# Patient Record
Sex: FEMALE | Race: WHITE | NOT HISPANIC OR LATINO | URBAN - METROPOLITAN AREA
[De-identification: names, ages, dates, MRNs, and addresses within clinical notes are randomized per-mention and may not be internally consistent; named-entity substitution may affect disease eponyms.]

---

## 2023-03-20 ENCOUNTER — TELEPHONE (OUTPATIENT)
Dept: PRIMARY CARE | Facility: CLINIC | Age: 63
End: 2023-03-20

## 2023-03-20 DIAGNOSIS — L40.9 PSORIASIS: Primary | ICD-10-CM

## 2023-03-21 RX ORDER — APREMILAST 30 MG/1
TABLET, FILM COATED ORAL
Qty: 60 TABLET | Refills: 5 | Status: SHIPPED | OUTPATIENT
Start: 2023-03-21 | End: 2023-10-14 | Stop reason: SDUPTHER

## 2023-07-10 ENCOUNTER — OFFICE VISIT (OUTPATIENT)
Dept: PRIMARY CARE | Facility: CLINIC | Age: 63
End: 2023-07-10
Payer: MEDICARE

## 2023-07-10 VITALS
OXYGEN SATURATION: 98 % | TEMPERATURE: 98 F | HEIGHT: 62 IN | WEIGHT: 124.6 LBS | SYSTOLIC BLOOD PRESSURE: 130 MMHG | HEART RATE: 89 BPM | RESPIRATION RATE: 16 BRPM | BODY MASS INDEX: 22.93 KG/M2 | DIASTOLIC BLOOD PRESSURE: 79 MMHG

## 2023-07-10 DIAGNOSIS — F17.211 NICOTINE DEPENDENCE, CIGARETTES, IN REMISSION: ICD-10-CM

## 2023-07-10 DIAGNOSIS — Z12.31 VISIT FOR SCREENING MAMMOGRAM: ICD-10-CM

## 2023-07-10 DIAGNOSIS — L40.9 PSORIASIS: ICD-10-CM

## 2023-07-10 DIAGNOSIS — Z00.00 WELCOME TO MEDICARE PREVENTIVE VISIT: Primary | ICD-10-CM

## 2023-07-10 DIAGNOSIS — F41.9 ANXIETY: ICD-10-CM

## 2023-07-10 DIAGNOSIS — E55.9 VITAMIN D DEFICIENCY: Primary | ICD-10-CM

## 2023-07-10 DIAGNOSIS — Z01.818 PREOPERATIVE CLEARANCE: ICD-10-CM

## 2023-07-10 DIAGNOSIS — Z78.0 MENOPAUSE: ICD-10-CM

## 2023-07-10 DIAGNOSIS — M15.9 PRIMARY OSTEOARTHRITIS INVOLVING MULTIPLE JOINTS: ICD-10-CM

## 2023-07-10 DIAGNOSIS — Z00.00 ROUTINE GENERAL MEDICAL EXAMINATION AT HEALTH CARE FACILITY: ICD-10-CM

## 2023-07-10 DIAGNOSIS — Z00.00 WELCOME TO MEDICARE PREVENTIVE VISIT: ICD-10-CM

## 2023-07-10 DIAGNOSIS — Z12.11 COLON CANCER SCREENING: ICD-10-CM

## 2023-07-10 DIAGNOSIS — Z13.6 SCREENING FOR CARDIOVASCULAR CONDITION: ICD-10-CM

## 2023-07-10 DIAGNOSIS — F17.210 CIGARETTE NICOTINE DEPENDENCE WITHOUT COMPLICATION: ICD-10-CM

## 2023-07-10 PROBLEM — J44.9 COPD (CHRONIC OBSTRUCTIVE PULMONARY DISEASE) (MULTI): Status: ACTIVE | Noted: 2023-07-10

## 2023-07-10 PROBLEM — F32.A DEPRESSION: Status: ACTIVE | Noted: 2023-07-10

## 2023-07-10 PROBLEM — L40.50 PSORIATIC ARTHRITIS (MULTI): Status: ACTIVE | Noted: 2023-07-10

## 2023-07-10 PROBLEM — M25.561 CHRONIC PAIN OF BOTH KNEES: Status: ACTIVE | Noted: 2023-07-10

## 2023-07-10 PROBLEM — M15.0 PRIMARY OSTEOARTHRITIS INVOLVING MULTIPLE JOINTS: Status: ACTIVE | Noted: 2023-07-10

## 2023-07-10 PROBLEM — G89.29 CHRONIC PAIN OF BOTH KNEES: Status: ACTIVE | Noted: 2023-07-10

## 2023-07-10 PROBLEM — M25.562 CHRONIC PAIN OF BOTH KNEES: Status: ACTIVE | Noted: 2023-07-10

## 2023-07-10 PROBLEM — F17.200 NICOTINE DEPENDENCE: Status: ACTIVE | Noted: 2023-07-10

## 2023-07-10 PROCEDURE — 4004F PT TOBACCO SCREEN RCVD TLK: CPT | Performed by: FAMILY MEDICINE

## 2023-07-10 PROCEDURE — 1159F MED LIST DOCD IN RCRD: CPT | Performed by: FAMILY MEDICINE

## 2023-07-10 PROCEDURE — 1160F RVW MEDS BY RX/DR IN RCRD: CPT | Performed by: FAMILY MEDICINE

## 2023-07-10 PROCEDURE — G0402 INITIAL PREVENTIVE EXAM: HCPCS | Performed by: FAMILY MEDICINE

## 2023-07-10 PROCEDURE — G0403 EKG FOR INITIAL PREVENT EXAM: HCPCS | Performed by: FAMILY MEDICINE

## 2023-07-10 PROCEDURE — 1170F FXNL STATUS ASSESSED: CPT | Performed by: FAMILY MEDICINE

## 2023-07-10 RX ORDER — HYDROXYZINE HYDROCHLORIDE 25 MG/1
1 TABLET, FILM COATED ORAL 3 TIMES DAILY PRN
COMMUNITY
Start: 2022-07-21 | End: 2023-07-10 | Stop reason: SDUPTHER

## 2023-07-10 RX ORDER — IBUPROFEN 600 MG/1
600 TABLET ORAL 4 TIMES DAILY PRN
Qty: 90 TABLET | Refills: 1 | Status: SHIPPED | OUTPATIENT
Start: 2023-07-10 | End: 2024-04-17

## 2023-07-10 RX ORDER — VIT C/E/ZN/COPPR/LUTEIN/ZEAXAN 250MG-90MG
CAPSULE ORAL
COMMUNITY

## 2023-07-10 RX ORDER — HYDROXYZINE HYDROCHLORIDE 25 MG/1
25 TABLET, FILM COATED ORAL 3 TIMES DAILY PRN
Qty: 90 TABLET | Refills: 2 | Status: SHIPPED | OUTPATIENT
Start: 2023-07-10

## 2023-07-10 RX ORDER — ASPIRIN 81 MG/1
TABLET ORAL
COMMUNITY

## 2023-07-10 ASSESSMENT — ACTIVITIES OF DAILY LIVING (ADL)
MANAGING_FINANCES: INDEPENDENT
BATHING: INDEPENDENT
GROCERY_SHOPPING: INDEPENDENT
DOING_HOUSEWORK: INDEPENDENT
DRESSING: INDEPENDENT
TAKING_MEDICATION: INDEPENDENT

## 2023-07-10 ASSESSMENT — PATIENT HEALTH QUESTIONNAIRE - PHQ9
2. FEELING DOWN, DEPRESSED OR HOPELESS: NOT AT ALL
2. FEELING DOWN, DEPRESSED OR HOPELESS: SEVERAL DAYS
SUM OF ALL RESPONSES TO PHQ9 QUESTIONS 1 & 2: 2
1. LITTLE INTEREST OR PLEASURE IN DOING THINGS: NOT AT ALL
1. LITTLE INTEREST OR PLEASURE IN DOING THINGS: SEVERAL DAYS
SUM OF ALL RESPONSES TO PHQ9 QUESTIONS 1 AND 2: 0
10. IF YOU CHECKED OFF ANY PROBLEMS, HOW DIFFICULT HAVE THESE PROBLEMS MADE IT FOR YOU TO DO YOUR WORK, TAKE CARE OF THINGS AT HOME, OR GET ALONG WITH OTHER PEOPLE: SOMEWHAT DIFFICULT

## 2023-07-10 ASSESSMENT — ENCOUNTER SYMPTOMS
FLANK PAIN: 1
DEPRESSION: 1
LOSS OF SENSATION IN FEET: 0
OCCASIONAL FEELINGS OF UNSTEADINESS: 0
HEMATURIA: 1
ARTHRALGIAS: 1

## 2023-07-10 ASSESSMENT — LIFESTYLE VARIABLES
HOW OFTEN DO YOU HAVE SIX OR MORE DRINKS ON ONE OCCASION: NEVER
HOW MANY STANDARD DRINKS CONTAINING ALCOHOL DO YOU HAVE ON A TYPICAL DAY: 1 OR 2
SKIP TO QUESTIONS 9-10: 1
HOW OFTEN DO YOU HAVE A DRINK CONTAINING ALCOHOL: MONTHLY OR LESS
AUDIT-C TOTAL SCORE: 1

## 2023-07-10 NOTE — PROGRESS NOTES
"Subjective   Reason for Visit: Barbara Givens is an 63 y.o. female here for a Medicare Wellness visit.     Past Medical, Surgical, and Family History reviewed and updated in chart.    Reviewed all medications by prescribing practitioner or clinical pharmacist (such as prescriptions, OTCs, herbal therapies and supplements) and documented in the medical record.    Med Refill  Associated symptoms include arthralgias.       Patient Care Team:  Adriana Esteban MD as PCP - General  Adriana Esteban MD as PCP - [employer] PCP     Review of Systems   Genitourinary:  Positive for flank pain and hematuria.   Musculoskeletal:  Positive for arthralgias.       Objective   Vitals:  /79   Pulse 89   Temp 36.7 °C (98 °F) (Temporal)   Resp 16   Ht 1.575 m (5' 2.01\")   Wt 56.5 kg (124 lb 9.6 oz)   SpO2 98%   BMI 22.78 kg/m²       Physical Exam    Assessment/Plan   Problem List Items Addressed This Visit       Nicotine dependence, cigarettes, in remission    Vitamin D deficiency - Primary    Relevant Orders    Vitamin D 25-Hydroxy,Total    Psoriasis    Welcome to Medicare preventive visit    Menopause    Relevant Orders    XR DEXA bone density    Primary osteoarthritis involving multiple joints    Relevant Medications    ibuprofen 600 mg tablet     Other Visit Diagnoses       Anxiety        Relevant Medications    hydrOXYzine HCL (Atarax) 25 mg tablet    Screening for cardiovascular condition        Relevant Orders    ECG 12 lead (Completed)    Routine general medical examination at health care facility        Colon cancer screening        Relevant Orders    Referral to General Surgery          Cleared for your percutaneous nephrostomy- good luck. Get mammo., lung screening, labwork , and bone scan after.      "

## 2023-07-13 ENCOUNTER — HOSPITAL ENCOUNTER (OUTPATIENT)
Dept: DATA CONVERSION | Facility: HOSPITAL | Age: 63
End: 2023-07-14
Attending: STUDENT IN AN ORGANIZED HEALTH CARE EDUCATION/TRAINING PROGRAM | Admitting: STUDENT IN AN ORGANIZED HEALTH CARE EDUCATION/TRAINING PROGRAM
Payer: MEDICARE

## 2023-07-13 DIAGNOSIS — N20.0 CALCULUS OF KIDNEY: ICD-10-CM

## 2023-07-13 LAB
ABO GROUP (TYPE) IN BLOOD: NORMAL
ANION GAP IN SER/PLAS: 11 MMOL/L (ref 10–20)
ANTIBODY SCREEN: NORMAL
CALCIUM (MG/DL) IN SER/PLAS: 8.6 MG/DL (ref 8.6–10.3)
CARBON DIOXIDE, TOTAL (MMOL/L) IN SER/PLAS: 24 MMOL/L (ref 21–32)
CHLORIDE (MMOL/L) IN SER/PLAS: 109 MMOL/L (ref 98–107)
CREATININE (MG/DL) IN SER/PLAS: 0.78 MG/DL (ref 0.5–1.05)
ERYTHROCYTE DISTRIBUTION WIDTH (RATIO) BY AUTOMATED COUNT: 11.7 % (ref 11.5–14.5)
ERYTHROCYTE MEAN CORPUSCULAR HEMOGLOBIN CONCENTRATION (G/DL) BY AUTOMATED: 33.1 G/DL (ref 32–36)
ERYTHROCYTE MEAN CORPUSCULAR VOLUME (FL) BY AUTOMATED COUNT: 96 FL (ref 80–100)
ERYTHROCYTES (10*6/UL) IN BLOOD BY AUTOMATED COUNT: 4.41 X10E12/L (ref 4–5.2)
GFR FEMALE: 84 ML/MIN/1.73M2
GLUCOSE (MG/DL) IN SER/PLAS: 90 MG/DL (ref 74–99)
HEMATOCRIT (%) IN BLOOD BY AUTOMATED COUNT: 42.3 % (ref 36–46)
HEMOGLOBIN (G/DL) IN BLOOD: 14 G/DL (ref 12–16)
LEUKOCYTES (10*3/UL) IN BLOOD BY AUTOMATED COUNT: 9.6 X10E9/L (ref 4.4–11.3)
PLATELETS (10*3/UL) IN BLOOD AUTOMATED COUNT: 203 X10E9/L (ref 150–450)
POTASSIUM (MMOL/L) IN SER/PLAS: 4.1 MMOL/L (ref 3.5–5.3)
RH FACTOR: NORMAL
SODIUM (MMOL/L) IN SER/PLAS: 140 MMOL/L (ref 136–145)
UREA NITROGEN (MG/DL) IN SER/PLAS: 13 MG/DL (ref 6–23)

## 2023-07-14 LAB
ANION GAP IN SER/PLAS: 13 MMOL/L (ref 10–20)
CALCIUM (MG/DL) IN SER/PLAS: 8.5 MG/DL (ref 8.6–10.3)
CARBON DIOXIDE, TOTAL (MMOL/L) IN SER/PLAS: 24 MMOL/L (ref 21–32)
CHLORIDE (MMOL/L) IN SER/PLAS: 105 MMOL/L (ref 98–107)
COMPLETE PATHOLOGY REPORT: NORMAL
CONVERTED CLINICAL DIAGNOSIS-HISTORY: NORMAL
CONVERTED FINAL DIAGNOSIS: NORMAL
CONVERTED FINAL REPORT PDF LINK TO COPY AND PASTE: NORMAL
CONVERTED GROSS DESCRIPTION: NORMAL
CONVERTED MICROSCOPIC DESCRIPTION: NORMAL
CREATININE (MG/DL) IN SER/PLAS: 0.82 MG/DL (ref 0.5–1.05)
ERYTHROCYTE DISTRIBUTION WIDTH (RATIO) BY AUTOMATED COUNT: 11.9 % (ref 11.5–14.5)
ERYTHROCYTE MEAN CORPUSCULAR HEMOGLOBIN CONCENTRATION (G/DL) BY AUTOMATED: 33.9 G/DL (ref 32–36)
ERYTHROCYTE MEAN CORPUSCULAR VOLUME (FL) BY AUTOMATED COUNT: 94 FL (ref 80–100)
ERYTHROCYTES (10*6/UL) IN BLOOD BY AUTOMATED COUNT: 4.03 X10E12/L (ref 4–5.2)
GFR FEMALE: 79 ML/MIN/1.73M2
GLUCOSE (MG/DL) IN SER/PLAS: 91 MG/DL (ref 74–99)
HEMATOCRIT (%) IN BLOOD BY AUTOMATED COUNT: 38 % (ref 36–46)
HEMOGLOBIN (G/DL) IN BLOOD: 12.9 G/DL (ref 12–16)
LEUKOCYTES (10*3/UL) IN BLOOD BY AUTOMATED COUNT: 12.3 X10E9/L (ref 4.4–11.3)
PLATELETS (10*3/UL) IN BLOOD AUTOMATED COUNT: 223 X10E9/L (ref 150–450)
POTASSIUM (MMOL/L) IN SER/PLAS: 3.9 MMOL/L (ref 3.5–5.3)
SODIUM (MMOL/L) IN SER/PLAS: 138 MMOL/L (ref 136–145)
UREA NITROGEN (MG/DL) IN SER/PLAS: 11 MG/DL (ref 6–23)

## 2023-07-21 LAB
CALCULI COMPOSITION: NORMAL
CALCULI DESCRIPTION: NORMAL
CALCULI MASS: 281 MG

## 2023-08-08 ENCOUNTER — LAB (OUTPATIENT)
Dept: LAB | Facility: LAB | Age: 63
End: 2023-08-08
Payer: MEDICARE

## 2023-08-08 DIAGNOSIS — E55.9 VITAMIN D DEFICIENCY: ICD-10-CM

## 2023-08-08 LAB — CALCIDIOL (25 OH VITAMIN D3) (NG/ML) IN SER/PLAS: 53 NG/ML

## 2023-08-08 PROCEDURE — 36415 COLL VENOUS BLD VENIPUNCTURE: CPT

## 2023-08-08 PROCEDURE — 82306 VITAMIN D 25 HYDROXY: CPT

## 2023-08-11 NOTE — RESULT ENCOUNTER NOTE
Bone scan positive for osteoporosis, meaning you should probably consdeir medication like once weekly alendronate. Do you want to discuss?

## 2023-09-29 VITALS — BODY MASS INDEX: 22.72 KG/M2 | HEIGHT: 62 IN | WEIGHT: 123.46 LBS

## 2023-09-30 NOTE — DISCHARGE SUMMARY
Send Summary:   Discharge Summary Providers:  Provider Role Provider Name   · Primary Adriana Esteban       Note Recipients: Adriana Esteban MD       Discharge:    Summary:   Admission Date: .13-Jul-2023 08:51:00   Discharge Date: 14-Jul-2023   Admission Reason: Right PCNL   Final Discharge Diagnoses: Right nephrolithiasis   Procedures: Date: 13-Jul-2023 13:20:00  Procedure Name: 1.  Right complex percutaneous nephrolithotomy (PCNL) - multiple separate caliceal stones   2. Right antegrade ureteroscopy with laser lithotripsy  3. Right ureteral stent insertion   Vital Signs:        T   P  R  BP   MAP  SpO2   Value  37.2  69  16  135/74      95%  Date/Time 7/14 3:22 7/14 3:22 7/14 3:22 7/14 3:22 7/14 3:22  Range  (36.7C - 37.5C )  (65 - 70 )  (16 - 16 )  (130 - 154 )/ (74 - 86 )    (94% - 98% )  Highest temp of 37.5 C was recorded at 7/13 17:16    Date:            Weight/Scale Type:  Height:   13-Jul-2023 15:52  56  kg / bed  157.4  cm  Hospital Course:    Patient admitted after right PCNL for pain control, hemodynamic monitoring. Did well POD 0. By POD #0, renal function remained within normal limits, hemoglobin was  relatively stable, patient had achieved adequate pain control and deemed appropriate for discharge. She will follow up in 3 weeks for ureteral stent removal.       Discharge Information:    and Continuing Care:   Lab Results - Pending:    Surgical Pathology Drawn at 13-Jul-2023 13:05:00  Radiology Results - Pending: None   Discharge Instructions:    Activity:           activity as tolerated.          May shower..  tomorrow          May not drive while taking narcotics.            No pushing, pulling, or lifting objects greater than 20 pounds for 1 week(s).      Nutrition/Diet:           regular    Wound Care:           Wound Site:   right flank          Change Dressing:   as needed          Cover With:   4x4 gauze,  or bandaid    Discharge Medications: Home Medication    hydrocodone-acetaminophen 5 mg-325 mg oral tablet - 1 tab(s) orally every 6 hours as needed for pain   Doculase 100 mg oral capsule - 1 cap(s) orally 2 times a day while taking pain medication; hold for loose stools 1  multivitamin Multiple Vitamins oral capsule - 1 cap(s) oral once a day  Biotin - 1 cap(s) oral once a day  hydrOXYzine hydrochloride 25 mg oral tablet - 1 cap(s) oral prn  Vitamin D3 - 1 cap(s) oral once a day  Otezla 30 mg oral tablet - 1 cap(s) oral once a day     PRN Medication     DNR Status:   ·  Code Status Code Status order at time of discharge: Full Code       Electronic Signatures:  James Felix)  (Signed 14-Jul-2023 08:34)   Authored: Send Summary, Summary Content, Ongoing Care,  DNR Status, Note Completion      Last Updated: 14-Jul-2023 08:34 by James Felix)

## 2023-09-30 NOTE — H&P
History of Present Illness:   History Present Illness:  Reason for surgery: Right nephrolithiasis   HPI:    Patient presents with above.  Here for right PCNL.  Essentially reviewed the risk of the procedure including bleeding, infection, pain, need for additional procedures,  risk of anesthesia, pseudoaneurysm/AV fistula, etc.    Allergies:        Allergies:  ·  No Known Allergies :     Home Medication Review:   Home Medications Reviewed: yes     Impression/Procedure:   ·  Impression and Planned Procedure: Right nephrolithiasis  Plan: Right PCNL       ERAS (Enhanced Recovery After Surgery):  ·  ERAS Patient: no       Physical Exam by System:    Respiratory/Thorax: Unlabored   Cardiovascular: Normal rate     Consent:   COVID-19 Consent:  ·  COVID-19 Risk Consent Surgeon has reviewed key risks related to the risk of vita COVID-19 and if they contract COVID-19 what the risks are.       Electronic Signatures:  James Felix)  (Signed 13-Jul-2023 09:51)   Authored: History of Present Illness, Allergies, Home  Medication Review, Impression/Procedure, ERAS, Physical Exam, Consent, Note Completion      Last Updated: 13-Jul-2023 09:51 by James Felix)

## 2023-10-02 NOTE — OP NOTE
PROCEDURE DETAILS    Preoperative Diagnosis:  Renal calculus, right, N20.0    Postoperative Diagnosis:  Renal calculus, right, N20.0    Surgeon: James Felix  Resident/Fellow/Other Assistant: None of these were associated with this case    Procedure:  1.  Right complex percutaneous nephrolithotomy (PCNL) - multiple separate caliceal stones   2. Right antegrade ureteroscopy with laser lithotripsy  3. Right ureteral stent insertion   Anesthesia: No anesthesiologist associated with this case  Estimated Blood Loss: 5 ml   Findings: 1.5 cm renal pelvis calculus  7 mm posterior caliceal stone  5 mm mid ureteral calculus   Specimens(s) Collected: yes,  stone for analysis    Complications: none immediate   Drains and/or Catheters: 16 F flank drain  6 F x 24 cm ureteral stent (without strings)        Operative Report:   Patient was met in the preoperative holding area where informed consent was obtained.  Brought to the operative suite where general anesthesia was induced in  supine position.  Funk catheter placed.  EPC cuffs on and working.  Received 2 g Ancef IV piggyback for antibiotic prophylaxis.  Transferred to prone position with all pressure points including knees and shoulders padded.  Prepped and draped in usual  fashion.  Timeout performed and all parties in agreement.  Interventional radiology had achieved posterior upper pole access prior in the day.  Advanced a straight Super Stiff Glidewire down into the bladder under fluoroscopic guidance and placed a second  Glidewire alongside this into the bladder.  Advanced a dual-lumen catheter to measure calyceal depth of 5 cm from the skin.  Antegrade nephrostogram with filling defect posterior upper pole calyx and larger stone radiopaque in the renal pelvis.  Used  a encore [employer] 30 Kyr[place]z balloon to dilate the tract in usual fashion and over this advanced a 30 Kyrgyz by 12 cm sheath into the upper pole calyx.  Immediately encountered the larger  upper to mid pole calyceal stone which was treated entirely  with the trilogy device.  I then advanced my nephroscope into the renal pelvis and treated the 1.5 cm calculus in the renal pelvis with a  combination of ballistic and ultrasonic energy.  Careful antegrade pyelogram and pan pyeloscopy demonstrated no  other substantial stone fragments however there was a dilated proximal ureter so I elected to perform antegrade ureteroscopy with a [employer] single use digital Future Fleet ureteroscope.  Encountered a 5 mm obstructing calculus in the mid ureter.   I presume this was a calculus previously noted in the lower pole as careful survey of the collecting system demonstrated no other substantial remaining fragments.  I used a 200 µm holmium laser fiber to fragment the calculus into submillimeter pieces  which were pushed into the bladder.  There was no damage to the ureter or remaining fragments.  Over the remaining safety wire I advanced a 6 Kiswahili by 24 cm [employer] stent without strings in the usual fashion with the distal curl position  confirmed fluoroscopically in the proximal curl position confirmed nephroscopically.  As there is mild bleeding from the percutaneous tract elected to leave a nephrostomy drain as well and I placed a 16 Kiswahili Soboba tip catheter into the renal pelvis  under fluoroscopic guidance.  Antegrade nephrostogram without evidence of extravasation or remaining stone fragment.  Access sheath was removed and catheter was secured at the level of the skin with an 0 Ethibond suture.  No brisk bleeding from the skin.   Dressing applied.  Patient was awoken and taken to recovery in stable condition after returning to supine position.  Note that this is a complex procedure due to location of stones and multiple separate calyces, as well as requirement for antegrade ureteroscopy  and laser lithotripsy.    Plan:  The patient will be admitted overnight for hemodynamic monitoring.   She will be discharged tomorrow morning after flank drain removal if her hemoglobin  remained stable.                        Attestation:   Note Completion:  Attending Attestation I performed the procedure without a resident         Electronic Signatures:  James Felix)  (Signed 13-Jul-2023 13:30)   Authored: Post-Operative Note, Chart Review, Note Completion      Last Updated: 13-Jul-2023 13:30 by James Felix)

## 2023-10-14 DIAGNOSIS — L40.9 PSORIASIS: ICD-10-CM

## 2023-10-14 RX ORDER — APREMILAST 30 MG/1
30 TABLET, FILM COATED ORAL 2 TIMES DAILY
Qty: 60 TABLET | Refills: 5 | Status: SHIPPED | OUTPATIENT
Start: 2023-10-14 | End: 2024-04-12

## 2023-10-15 ENCOUNTER — SPECIALTY PHARMACY (OUTPATIENT)
Dept: PHARMACY | Facility: CLINIC | Age: 63
End: 2023-10-15

## 2023-10-19 ENCOUNTER — SPECIALTY PHARMACY (OUTPATIENT)
Dept: PHARMACY | Facility: CLINIC | Age: 63
End: 2023-10-19

## 2023-10-24 ENCOUNTER — TELEPHONE (OUTPATIENT)
Dept: PRIMARY CARE | Facility: CLINIC | Age: 63
End: 2023-10-24
Payer: MEDICARE

## 2023-10-24 DIAGNOSIS — M81.0 OSTEOPOROSIS WITHOUT CURRENT PATHOLOGICAL FRACTURE, UNSPECIFIED OSTEOPOROSIS TYPE: Primary | ICD-10-CM

## 2023-10-25 RX ORDER — ALENDRONATE SODIUM 70 MG/1
70 TABLET ORAL
Qty: 4 TABLET | Refills: 11 | Status: SHIPPED | OUTPATIENT
Start: 2023-10-25 | End: 2024-10-24

## 2023-12-11 ENCOUNTER — APPOINTMENT (OUTPATIENT)
Dept: GASTROENTEROLOGY | Facility: HOSPITAL | Age: 63
End: 2023-12-11
Payer: MEDICARE

## 2023-12-27 ENCOUNTER — TELEPHONE (OUTPATIENT)
Dept: PRIMARY CARE | Facility: CLINIC | Age: 63
End: 2023-12-27
Payer: MEDICARE

## 2023-12-27 DIAGNOSIS — H66.90 ACUTE EAR INFECTION, UNSPECIFIED LATERALITY: Primary | ICD-10-CM

## 2023-12-27 NOTE — TELEPHONE ENCOUNTER
New red request     Antibiotics for an ear infections    Started about 5 days sounds of fluid in left ear     No medication

## 2024-01-02 PROBLEM — J40 BRONCHITIS: Status: ACTIVE | Noted: 2024-01-02

## 2024-01-02 RX ORDER — CEFDINIR 300 MG/1
300 CAPSULE ORAL 2 TIMES DAILY
Qty: 20 CAPSULE | Refills: 0 | Status: SHIPPED | OUTPATIENT
Start: 2024-01-02 | End: 2024-01-12

## 2024-01-02 NOTE — TELEPHONE ENCOUNTER
Patient went to [facility] and completed ATB's and is still having fluid in ears, pain, and is wondering if she could have an appt or another antibiotic   Please call [phone]

## 2024-01-04 ENCOUNTER — TELEPHONE (OUTPATIENT)
Dept: PRIMARY CARE | Facility: CLINIC | Age: 64
End: 2024-01-04
Payer: MEDICARE

## 2024-01-08 NOTE — TELEPHONE ENCOUNTER
Cefdinir was send 1/2/2024, has that made any difference? Could it be wax? If the antibiotic does not make it better, I would suggest being seen somewhere!     Cefdinir did not help. She is going to see ENT

## 2024-01-29 ENCOUNTER — TELEPHONE (OUTPATIENT)
Dept: PRIMARY CARE | Facility: CLINIC | Age: 64
End: 2024-01-29
Payer: MEDICARE

## 2024-03-25 ENCOUNTER — TELEPHONE (OUTPATIENT)
Dept: PRIMARY CARE | Facility: CLINIC | Age: 64
End: 2024-03-25
Payer: MEDICARE

## 2024-04-17 DIAGNOSIS — M15.9 PRIMARY OSTEOARTHRITIS INVOLVING MULTIPLE JOINTS: ICD-10-CM

## 2024-04-17 RX ORDER — IBUPROFEN 600 MG/1
600 TABLET ORAL 4 TIMES DAILY PRN
Qty: 90 TABLET | Refills: 1 | Status: SHIPPED | OUTPATIENT
Start: 2024-04-17

## 2024-05-08 DIAGNOSIS — L40.9 PSORIASIS: ICD-10-CM

## 2024-05-08 RX ORDER — APREMILAST 30 MG/1
30 TABLET, FILM COATED ORAL 2 TIMES DAILY
Qty: 60 TABLET | Refills: 8 | Status: SHIPPED | OUTPATIENT
Start: 2024-05-08

## 2024-05-15 ENCOUNTER — TELEPHONE (OUTPATIENT)
Dept: UROLOGY | Facility: CLINIC | Age: 64
End: 2024-05-15
Payer: MEDICARE

## 2024-05-15 DIAGNOSIS — R35.0 URINARY FREQUENCY: Primary | ICD-10-CM

## 2024-05-15 NOTE — TELEPHONE ENCOUNTER
[facility] requesting refill on Gemtesa 75MG 1 tablet daily, patient last saw Dr. Felix 9/2023, no upcoming appointments.   Patient picked up RX 4/22/2024, pharmacy asking for refills.

## 2024-06-21 ENCOUNTER — TELEPHONE (OUTPATIENT)
Dept: PRIMARY CARE | Facility: CLINIC | Age: 64
End: 2024-06-21
Payer: MEDICARE

## 2024-06-21 NOTE — TELEPHONE ENCOUNTER
[facility] is calling regarding the diagnosis verification form they received the fax and cannot get her approved with depression unspecified, they are asking if she has major depressive order?

## 2024-06-24 PROBLEM — F32.0 CURRENT MILD EPISODE OF MAJOR DEPRESSIVE DISORDER WITHOUT PRIOR EPISODE (CMS-HCC): Status: ACTIVE | Noted: 2023-07-10

## 2024-10-17 ENCOUNTER — APPOINTMENT (OUTPATIENT)
Dept: LAB | Facility: LAB | Age: 64
End: 2024-10-17
Payer: MEDICARE

## 2024-10-17 ENCOUNTER — APPOINTMENT (OUTPATIENT)
Dept: PRIMARY CARE | Facility: CLINIC | Age: 64
End: 2024-10-17
Payer: MEDICARE

## 2024-10-17 VITALS
DIASTOLIC BLOOD PRESSURE: 82 MMHG | HEIGHT: 62 IN | WEIGHT: 127 LBS | HEART RATE: 84 BPM | BODY MASS INDEX: 23.37 KG/M2 | OXYGEN SATURATION: 98 % | RESPIRATION RATE: 16 BRPM | SYSTOLIC BLOOD PRESSURE: 126 MMHG

## 2024-10-17 DIAGNOSIS — L40.50 PSORIATIC ARTHRITIS (MULTI): ICD-10-CM

## 2024-10-17 DIAGNOSIS — Z00.00 ROUTINE GENERAL MEDICAL EXAMINATION AT HEALTH CARE FACILITY: Primary | ICD-10-CM

## 2024-10-17 DIAGNOSIS — F32.0 CURRENT MILD EPISODE OF MAJOR DEPRESSIVE DISORDER WITHOUT PRIOR EPISODE (CMS-HCC): ICD-10-CM

## 2024-10-17 DIAGNOSIS — Z12.31 VISIT FOR SCREENING MAMMOGRAM: ICD-10-CM

## 2024-10-17 DIAGNOSIS — J43.2 CENTRILOBULAR EMPHYSEMA (MULTI): ICD-10-CM

## 2024-10-17 DIAGNOSIS — F17.210 CIGARETTE NICOTINE DEPENDENCE WITHOUT COMPLICATION: ICD-10-CM

## 2024-10-17 DIAGNOSIS — E78.5 BORDERLINE HYPERLIPIDEMIA: ICD-10-CM

## 2024-10-17 DIAGNOSIS — F41.9 ANXIETY: ICD-10-CM

## 2024-10-17 DIAGNOSIS — M15.0 PRIMARY OSTEOARTHRITIS INVOLVING MULTIPLE JOINTS: ICD-10-CM

## 2024-10-17 PROBLEM — I83.813 VARICOSE VEINS OF BOTH LOWER EXTREMITIES WITH PAIN: Status: ACTIVE | Noted: 2024-10-17

## 2024-10-17 PROBLEM — M25.539 CHRONIC WRIST PAIN: Status: ACTIVE | Noted: 2024-10-17

## 2024-10-17 PROBLEM — J01.90 ACUTE BACTERIAL SINUSITIS: Status: ACTIVE | Noted: 2024-10-17

## 2024-10-17 PROBLEM — B96.89 ACUTE BACTERIAL SINUSITIS: Status: ACTIVE | Noted: 2024-10-17

## 2024-10-17 PROBLEM — J43.9 EMPHYSEMA/COPD (MULTI): Status: ACTIVE | Noted: 2024-10-17

## 2024-10-17 PROBLEM — M65.30 TRIGGER FINGER: Status: ACTIVE | Noted: 2024-10-17

## 2024-10-17 PROBLEM — H40.9 GLAUCOMA: Status: ACTIVE | Noted: 2024-10-17

## 2024-10-17 PROBLEM — N32.81 OVERACTIVE BLADDER: Status: ACTIVE | Noted: 2024-10-17

## 2024-10-17 PROBLEM — R29.898 WEAKNESS OF HAND: Status: ACTIVE | Noted: 2024-10-17

## 2024-10-17 PROBLEM — M19.90 ARTHRITIS: Status: ACTIVE | Noted: 2024-10-17

## 2024-10-17 PROBLEM — T16.1XXA FOREIGN BODY OF RIGHT EAR: Status: ACTIVE | Noted: 2024-10-17

## 2024-10-17 PROBLEM — M75.81 TENDINITIS OF BOTH ROTATOR CUFFS: Status: ACTIVE | Noted: 2024-10-17

## 2024-10-17 PROBLEM — F17.200 NICOTINE DEPENDENCE: Status: ACTIVE | Noted: 2024-10-17

## 2024-10-17 PROBLEM — N20.0 BILATERAL NEPHROLITHIASIS: Status: ACTIVE | Noted: 2024-10-17

## 2024-10-17 PROBLEM — I83.90 VARICOSE VEIN OF LEG: Status: ACTIVE | Noted: 2024-10-17

## 2024-10-17 PROBLEM — M75.82 TENDINITIS OF BOTH ROTATOR CUFFS: Status: ACTIVE | Noted: 2024-10-17

## 2024-10-17 PROBLEM — N95.2 POST-MENOPAUSAL ATROPHIC VAGINITIS: Status: ACTIVE | Noted: 2024-10-17

## 2024-10-17 PROBLEM — M25.50 ARTHRALGIA: Status: ACTIVE | Noted: 2024-10-17

## 2024-10-17 PROBLEM — G89.29 CHRONIC WRIST PAIN: Status: ACTIVE | Noted: 2024-10-17

## 2024-10-17 PROBLEM — R39.9 UTI SYMPTOMS: Status: ACTIVE | Noted: 2024-10-17

## 2024-10-17 PROBLEM — R31.9 HEMATURIA: Status: ACTIVE | Noted: 2024-10-17

## 2024-10-17 PROBLEM — L57.0 ACTINIC KERATOSIS: Status: ACTIVE | Noted: 2024-10-17

## 2024-10-17 PROBLEM — R10.9 FLANK PAIN, ACUTE: Status: ACTIVE | Noted: 2024-10-17

## 2024-10-17 PROCEDURE — 1160F RVW MEDS BY RX/DR IN RCRD: CPT | Performed by: FAMILY MEDICINE

## 2024-10-17 PROCEDURE — 1159F MED LIST DOCD IN RCRD: CPT | Performed by: FAMILY MEDICINE

## 2024-10-17 PROCEDURE — 1123F ACP DISCUSS/DSCN MKR DOCD: CPT | Performed by: FAMILY MEDICINE

## 2024-10-17 PROCEDURE — 1170F FXNL STATUS ASSESSED: CPT | Performed by: FAMILY MEDICINE

## 2024-10-17 PROCEDURE — 1158F ADVNC CARE PLAN TLK DOCD: CPT | Performed by: FAMILY MEDICINE

## 2024-10-17 PROCEDURE — G0439 PPPS, SUBSEQ VISIT: HCPCS | Performed by: FAMILY MEDICINE

## 2024-10-17 PROCEDURE — 4004F PT TOBACCO SCREEN RCVD TLK: CPT | Performed by: FAMILY MEDICINE

## 2024-10-17 PROCEDURE — 3008F BODY MASS INDEX DOCD: CPT | Performed by: FAMILY MEDICINE

## 2024-10-17 RX ORDER — IBUPROFEN 600 MG/1
600 TABLET ORAL 4 TIMES DAILY PRN
Qty: 90 TABLET | Refills: 1 | Status: SHIPPED | OUTPATIENT
Start: 2024-10-17

## 2024-10-17 RX ORDER — IBUPROFEN 600 MG/1
600 TABLET ORAL 4 TIMES DAILY PRN
Qty: 90 TABLET | Refills: 1 | Status: SHIPPED | OUTPATIENT
Start: 2024-10-17 | End: 2024-10-17

## 2024-10-17 RX ORDER — ESCITALOPRAM OXALATE 5 MG/1
5 TABLET ORAL DAILY
Qty: 30 TABLET | Refills: 2 | Status: SHIPPED | OUTPATIENT
Start: 2024-10-17 | End: 2024-10-17

## 2024-10-17 RX ORDER — ESCITALOPRAM OXALATE 5 MG/1
5 TABLET ORAL DAILY
Qty: 30 TABLET | Refills: 2 | Status: SHIPPED | OUTPATIENT
Start: 2024-10-17 | End: 2025-01-15

## 2024-10-17 RX ORDER — FERROUS SULFATE, DRIED 160(50) MG
1 TABLET, EXTENDED RELEASE ORAL 2 TIMES DAILY
COMMUNITY
Start: 2024-10-17

## 2024-10-17 RX ORDER — ELECTROLYTES/DEXTROSE
SOLUTION, ORAL ORAL
COMMUNITY

## 2024-10-17 RX ORDER — HYDROXYZINE HYDROCHLORIDE 25 MG/1
25 TABLET, FILM COATED ORAL 3 TIMES DAILY PRN
Qty: 90 TABLET | Refills: 2 | Status: SHIPPED | OUTPATIENT
Start: 2024-10-17

## 2024-10-17 RX ORDER — HYDROXYZINE HYDROCHLORIDE 25 MG/1
25 TABLET, FILM COATED ORAL 3 TIMES DAILY PRN
Qty: 90 TABLET | Refills: 2 | Status: SHIPPED | OUTPATIENT
Start: 2024-10-17 | End: 2024-10-17

## 2024-10-17 ASSESSMENT — ANXIETY QUESTIONNAIRES
7. FEELING AFRAID AS IF SOMETHING AWFUL MIGHT HAPPEN: NOT AT ALL
6. BECOMING EASILY ANNOYED OR IRRITABLE: NOT AT ALL
4. TROUBLE RELAXING: NOT AT ALL
IF YOU CHECKED OFF ANY PROBLEMS ON THIS QUESTIONNAIRE, HOW DIFFICULT HAVE THESE PROBLEMS MADE IT FOR YOU TO DO YOUR WORK, TAKE CARE OF THINGS AT HOME, OR GET ALONG WITH OTHER PEOPLE: NOT DIFFICULT AT ALL
5. BEING SO RESTLESS THAT IT IS HARD TO SIT STILL: NOT AT ALL
GAD7 TOTAL SCORE: 0
2. NOT BEING ABLE TO STOP OR CONTROL WORRYING: NOT AT ALL
3. WORRYING TOO MUCH ABOUT DIFFERENT THINGS: NOT AT ALL
1. FEELING NERVOUS, ANXIOUS, OR ON EDGE: NOT AT ALL

## 2024-10-17 ASSESSMENT — ENCOUNTER SYMPTOMS
DYSPHORIC MOOD: 1
OCCASIONAL FEELINGS OF UNSTEADINESS: 0
DEPRESSION: 0
LOSS OF SENSATION IN FEET: 0

## 2024-10-17 ASSESSMENT — PATIENT HEALTH QUESTIONNAIRE - PHQ9
SUM OF ALL RESPONSES TO PHQ9 QUESTIONS 1 AND 2: 0
1. LITTLE INTEREST OR PLEASURE IN DOING THINGS: NOT AT ALL
2. FEELING DOWN, DEPRESSED OR HOPELESS: NOT AT ALL

## 2024-10-17 ASSESSMENT — ACTIVITIES OF DAILY LIVING (ADL)
MANAGING_FINANCES: INDEPENDENT
GROCERY_SHOPPING: INDEPENDENT
BATHING: INDEPENDENT
TAKING_MEDICATION: INDEPENDENT
DRESSING: INDEPENDENT
DOING_HOUSEWORK: INDEPENDENT

## 2024-10-17 NOTE — ASSESSMENT & PLAN NOTE
Stable and controlled  Orders:    ibuprofen (IBU) 600 mg tablet; Take 1 tablet (600 mg) by mouth 4 times a day as needed for mild pain (1 - 3).

## 2024-10-17 NOTE — ASSESSMENT & PLAN NOTE
Orders:    hydrOXYzine HCL (Atarax) 25 mg tablet; Take 1 tablet (25 mg) by mouth 3 times a day as needed for anxiety.

## 2024-10-17 NOTE — PROGRESS NOTES
"Subjective   Reason for Visit: Barbara Givens is an 64 y.o. female here for a Medicare Wellness visit.     Past Medical, Surgical, and Family History reviewed and updated in chart.    Reviewed all medications by prescribing practitioner or clinical pharmacist (such as prescriptions, OTCs, herbal therapies and supplements) and documented in the medical record.  Has dermatolgist, but does not take her insurance.   HPI  Barbara comes in today, and she states she is really feeling her age. Just got first social security, and house and car are pain off,. She cries a lot. And hates the politics, has cataract in left eye, to be done 11/06/2024  Medications reconciled. Taking Gemtesa when she goes out or travels. Thinking or stopping Otezla also.   Grief at loss of son 14 years ago, still bothers her at certain moments. Has friend who is Ge, who says be grateful for what you have,   Patient Care Team:  Adriana Esteban MD as PCP - General (Family Medicine)  Adriana Esteban MD as PCP - [employer]age PCP     Review of Systems   Skin:  Positive for rash.        Psoriatic lesions on legs   Psychiatric/Behavioral:  Positive for dysphoric mood.         Sad   All other systems reviewed and are negative.      Objective   Vitals:  /82   Pulse 84   Resp 16   Ht 1.574 m (5' 1.97\")   Wt 57.6 kg (127 lb)   SpO2 98%   BMI 23.25 kg/m²     Blood pressures on record were 134/77, 142/81, 138/82, 143/84  Physical Exam  Vitals reviewed.   Constitutional:       Appearance: Normal appearance.   HENT:      Head: Normocephalic and atraumatic.      Right Ear: Tympanic membrane normal.      Left Ear: Tympanic membrane normal.      Nose: Nose normal.      Mouth/Throat:      Mouth: Mucous membranes are moist.      Pharynx: Oropharynx is clear.   Eyes:      Extraocular Movements: Extraocular movements intact.      Conjunctiva/sclera: Conjunctivae normal.      Pupils: Pupils are equal, round, and reactive to light. "   Cardiovascular:      Rate and Rhythm: Normal rate and regular rhythm.      Pulses: Normal pulses.      Heart sounds: Normal heart sounds.   Pulmonary:      Effort: Pulmonary effort is normal.      Breath sounds: Normal breath sounds.   Abdominal:      General: Abdomen is flat. Bowel sounds are normal.      Palpations: Abdomen is soft.   Musculoskeletal:         General: Normal range of motion.      Cervical back: Normal range of motion and neck supple.   Skin:     General: Skin is warm and dry.      Capillary Refill: Capillary refill takes less than 2 seconds.      Findings: Rash present.      Comments: Psoriatic patches on legs.    Neurological:      General: No focal deficit present.      Mental Status: She is alert and oriented to person, place, and time.   Psychiatric:         Mood and Affect: Mood normal.         Behavior: Behavior normal.         Assessment & Plan  Visit for screening mammogram  Ordered  Orders:    BI mammo bilateral screening tomosynthesis; Future    Primary osteoarthritis involving multiple joints  Stable and controlled  Orders:    ibuprofen (IBU) 600 mg tablet; Take 1 tablet (600 mg) by mouth 4 times a day as needed for mild pain (1 - 3).    Anxiety    Orders:    hydrOXYzine HCL (Atarax) 25 mg tablet; Take 1 tablet (25 mg) by mouth 3 times a day as needed for anxiety.    Routine general medical examination at health care facility    Orders:    1 Year Follow Up In [facility] - PCP - Wellness Exam; Future    CBC and Auto Differential; Future    Comprehensive Metabolic Panel; Future    Lipid Panel; Future    TSH with reflex to Free T4 if abnormal; Future    Cigarette nicotine dependence without complication    Orders:    CT lung screening low dose; Future    Borderline hyperlipidemia    Orders:    CBC and Auto Differential; Future    Current mild episode of major depressive disorder without prior episode (CMS-HCC)    Orders:    escitalopram (Lexapro) 5 mg tablet; Take 1 tablet (5  mg) by mouth once daily.       No suicidal ideation  'Patient to call if ineffective or not tolerated.

## 2024-10-22 ENCOUNTER — LAB (OUTPATIENT)
Dept: LAB | Facility: LAB | Age: 64
End: 2024-10-22
Payer: MEDICARE

## 2024-10-22 DIAGNOSIS — E78.5 BORDERLINE HYPERLIPIDEMIA: ICD-10-CM

## 2024-10-22 DIAGNOSIS — Z00.00 ROUTINE GENERAL MEDICAL EXAMINATION AT HEALTH CARE FACILITY: ICD-10-CM

## 2024-10-22 LAB
ALBUMIN SERPL BCP-MCNC: 4.2 G/DL (ref 3.4–5)
ALP SERPL-CCNC: 86 U/L (ref 33–136)
ALT SERPL W P-5'-P-CCNC: 16 U/L (ref 7–45)
ANION GAP SERPL CALC-SCNC: 11 MMOL/L (ref 10–20)
AST SERPL W P-5'-P-CCNC: 19 U/L (ref 9–39)
BASOPHILS # BLD AUTO: 0.05 X10*3/UL (ref 0–0.1)
BASOPHILS NFR BLD AUTO: 0.5 %
BILIRUB SERPL-MCNC: 0.6 MG/DL (ref 0–1.2)
BUN SERPL-MCNC: 10 MG/DL (ref 6–23)
CALCIUM SERPL-MCNC: 10 MG/DL (ref 8.6–10.3)
CHLORIDE SERPL-SCNC: 102 MMOL/L (ref 98–107)
CHOLEST SERPL-MCNC: 209 MG/DL (ref 0–199)
CHOLESTEROL/HDL RATIO: 3.3
CO2 SERPL-SCNC: 29 MMOL/L (ref 21–32)
CREAT SERPL-MCNC: 0.76 MG/DL (ref 0.5–1.05)
EGFRCR SERPLBLD CKD-EPI 2021: 87 ML/MIN/1.73M*2
EOSINOPHIL # BLD AUTO: 0.11 X10*3/UL (ref 0–0.7)
EOSINOPHIL NFR BLD AUTO: 1.2 %
ERYTHROCYTE [DISTWIDTH] IN BLOOD BY AUTOMATED COUNT: 11.7 % (ref 11.5–14.5)
GLUCOSE SERPL-MCNC: 86 MG/DL (ref 74–99)
HCT VFR BLD AUTO: 45.6 % (ref 36–46)
HDLC SERPL-MCNC: 62.4 MG/DL
HGB BLD-MCNC: 15 G/DL (ref 12–16)
IMM GRANULOCYTES # BLD AUTO: 0.03 X10*3/UL (ref 0–0.7)
IMM GRANULOCYTES NFR BLD AUTO: 0.3 % (ref 0–0.9)
LDLC SERPL CALC-MCNC: 124 MG/DL
LYMPHOCYTES # BLD AUTO: 2.07 X10*3/UL (ref 1.2–4.8)
LYMPHOCYTES NFR BLD AUTO: 22.5 %
MCH RBC QN AUTO: 31.7 PG (ref 26–34)
MCHC RBC AUTO-ENTMCNC: 32.9 G/DL (ref 32–36)
MCV RBC AUTO: 96 FL (ref 80–100)
MONOCYTES # BLD AUTO: 0.63 X10*3/UL (ref 0.1–1)
MONOCYTES NFR BLD AUTO: 6.8 %
NEUTROPHILS # BLD AUTO: 6.32 X10*3/UL (ref 1.2–7.7)
NEUTROPHILS NFR BLD AUTO: 68.7 %
NON HDL CHOLESTEROL: 147 MG/DL (ref 0–149)
NRBC BLD-RTO: 0 /100 WBCS (ref 0–0)
PLATELET # BLD AUTO: 247 X10*3/UL (ref 150–450)
POTASSIUM SERPL-SCNC: 4 MMOL/L (ref 3.5–5.3)
PROT SERPL-MCNC: 7 G/DL (ref 6.4–8.2)
RBC # BLD AUTO: 4.73 X10*6/UL (ref 4–5.2)
SODIUM SERPL-SCNC: 138 MMOL/L (ref 136–145)
TRIGL SERPL-MCNC: 115 MG/DL (ref 0–149)
TSH SERPL-ACNC: 0.7 MIU/L (ref 0.44–3.98)
VLDL: 23 MG/DL (ref 0–40)
WBC # BLD AUTO: 9.2 X10*3/UL (ref 4.4–11.3)

## 2024-10-22 PROCEDURE — 85025 COMPLETE CBC W/AUTO DIFF WBC: CPT

## 2024-10-22 PROCEDURE — 80061 LIPID PANEL: CPT

## 2024-10-22 PROCEDURE — 80053 COMPREHEN METABOLIC PANEL: CPT

## 2024-10-22 PROCEDURE — 84443 ASSAY THYROID STIM HORMONE: CPT

## 2024-10-22 NOTE — RESULT ENCOUNTER NOTE
Barbara, thanks you for getting your blood work.  Everything looked essentially within normal limits.  Your cholesterol was slightly elevated but no change in medication is required.  Plan on rechecking in a year.

## 2024-11-18 ENCOUNTER — HOSPITAL ENCOUNTER (OUTPATIENT)
Dept: RADIOLOGY | Facility: HOSPITAL | Age: 64
Discharge: HOME | End: 2024-11-18
Payer: MEDICARE

## 2024-11-18 VITALS — WEIGHT: 125 LBS | BODY MASS INDEX: 23 KG/M2 | HEIGHT: 62 IN

## 2024-11-18 DIAGNOSIS — Z12.31 VISIT FOR SCREENING MAMMOGRAM: ICD-10-CM

## 2024-11-18 PROCEDURE — 77067 SCR MAMMO BI INCL CAD: CPT

## 2024-11-18 PROCEDURE — 77063 BREAST TOMOSYNTHESIS BI: CPT | Performed by: RADIOLOGY

## 2024-11-18 PROCEDURE — 77067 SCR MAMMO BI INCL CAD: CPT | Performed by: RADIOLOGY

## 2025-04-25 ENCOUNTER — TELEPHONE (OUTPATIENT)
Dept: PRIMARY CARE | Facility: CLINIC | Age: 65
End: 2025-04-25
Payer: MEDICARE

## 2025-04-29 ENCOUNTER — TELEPHONE (OUTPATIENT)
Facility: CLINIC | Age: 65
End: 2025-04-29
Payer: MEDICARE

## 2025-05-23 DIAGNOSIS — L40.9 PSORIASIS: ICD-10-CM

## 2025-05-23 RX ORDER — APREMILAST 30 MG/1
30 TABLET, FILM COATED ORAL 2 TIMES DAILY
Qty: 60 TABLET | Refills: 5 | Status: SHIPPED | OUTPATIENT
Start: 2025-05-23

## 2025-06-16 ENCOUNTER — ANESTHESIA EVENT (OUTPATIENT)
Dept: GASTROENTEROLOGY | Facility: HOSPITAL | Age: 65
End: 2025-06-16
Payer: MEDICARE

## 2025-06-16 ENCOUNTER — HOSPITAL ENCOUNTER (OUTPATIENT)
Dept: GASTROENTEROLOGY | Facility: HOSPITAL | Age: 65
Discharge: HOME | End: 2025-06-16
Payer: MEDICARE

## 2025-06-16 ENCOUNTER — ANESTHESIA (OUTPATIENT)
Dept: GASTROENTEROLOGY | Facility: HOSPITAL | Age: 65
End: 2025-06-16
Payer: MEDICARE

## 2025-06-16 VITALS
TEMPERATURE: 97.2 F | WEIGHT: 129 LBS | DIASTOLIC BLOOD PRESSURE: 86 MMHG | HEART RATE: 67 BPM | SYSTOLIC BLOOD PRESSURE: 160 MMHG | BODY MASS INDEX: 23.74 KG/M2 | HEIGHT: 62 IN | OXYGEN SATURATION: 100 % | RESPIRATION RATE: 18 BRPM

## 2025-06-16 DIAGNOSIS — Z12.11 COLON CANCER SCREENING: ICD-10-CM

## 2025-06-16 PROCEDURE — 2500000004 HC RX 250 GENERAL PHARMACY W/ HCPCS (ALT 636 FOR OP/ED): Performed by: INTERNAL MEDICINE

## 2025-06-16 PROCEDURE — 7100000009 HC PHASE TWO TIME - INITIAL BASE CHARGE

## 2025-06-16 PROCEDURE — 3700000001 HC GENERAL ANESTHESIA TIME - INITIAL BASE CHARGE

## 2025-06-16 PROCEDURE — 2720000007 HC OR 272 NO HCPCS

## 2025-06-16 PROCEDURE — 45380 COLONOSCOPY AND BIOPSY: CPT | Performed by: INTERNAL MEDICINE

## 2025-06-16 PROCEDURE — 3700000002 HC GENERAL ANESTHESIA TIME - EACH INCREMENTAL 1 MINUTE

## 2025-06-16 PROCEDURE — 7100000010 HC PHASE TWO TIME - EACH INCREMENTAL 1 MINUTE

## 2025-06-16 PROCEDURE — 45385 COLONOSCOPY W/LESION REMOVAL: CPT | Performed by: INTERNAL MEDICINE

## 2025-06-16 PROCEDURE — 2500000004 HC RX 250 GENERAL PHARMACY W/ HCPCS (ALT 636 FOR OP/ED): Performed by: NURSE ANESTHETIST, CERTIFIED REGISTERED

## 2025-06-16 RX ORDER — PROPOFOL 10 MG/ML
INJECTION, EMULSION INTRAVENOUS AS NEEDED
Status: DISCONTINUED | OUTPATIENT
Start: 2025-06-16 | End: 2025-06-16

## 2025-06-16 RX ORDER — FENTANYL CITRATE 50 UG/ML
INJECTION, SOLUTION INTRAMUSCULAR; INTRAVENOUS AS NEEDED
Status: DISCONTINUED | OUTPATIENT
Start: 2025-06-16 | End: 2025-06-16

## 2025-06-16 RX ORDER — SODIUM CHLORIDE 9 MG/ML
20 INJECTION, SOLUTION INTRAVENOUS CONTINUOUS
Status: ACTIVE | OUTPATIENT
Start: 2025-06-16 | End: 2025-06-16

## 2025-06-16 RX ORDER — LIDOCAINE HYDROCHLORIDE 20 MG/ML
INJECTION, SOLUTION INFILTRATION; PERINEURAL AS NEEDED
Status: DISCONTINUED | OUTPATIENT
Start: 2025-06-16 | End: 2025-06-16

## 2025-06-16 RX ADMIN — FENTANYL CITRATE 25 MCG: 50 INJECTION INTRAMUSCULAR; INTRAVENOUS at 11:19

## 2025-06-16 RX ADMIN — PROPOFOL 40 MG: 10 INJECTION, EMULSION INTRAVENOUS at 11:15

## 2025-06-16 RX ADMIN — PROPOFOL 40 MG: 10 INJECTION, EMULSION INTRAVENOUS at 11:24

## 2025-06-16 RX ADMIN — FENTANYL CITRATE 50 MCG: 50 INJECTION INTRAMUSCULAR; INTRAVENOUS at 11:12

## 2025-06-16 RX ADMIN — PROPOFOL 20 MG: 10 INJECTION, EMULSION INTRAVENOUS at 11:19

## 2025-06-16 RX ADMIN — PROPOFOL 100 MG: 10 INJECTION, EMULSION INTRAVENOUS at 11:12

## 2025-06-16 RX ADMIN — SODIUM CHLORIDE 20 ML/HR: 0.9 INJECTION, SOLUTION INTRAVENOUS at 11:01

## 2025-06-16 RX ADMIN — LIDOCAINE HYDROCHLORIDE 2 ML: 20 INJECTION, SOLUTION INFILTRATION; PERINEURAL at 11:12

## 2025-06-16 RX ADMIN — FENTANYL CITRATE 25 MCG: 50 INJECTION INTRAMUSCULAR; INTRAVENOUS at 11:15

## 2025-06-16 SDOH — HEALTH STABILITY: MENTAL HEALTH: CURRENT SMOKER: 1

## 2025-06-16 ASSESSMENT — PAIN SCALES - GENERAL
PAINLEVEL_OUTOF10: 0 - NO PAIN
PAIN_LEVEL: 0
PAINLEVEL_OUTOF10: 0 - NO PAIN

## 2025-06-16 ASSESSMENT — COLUMBIA-SUICIDE SEVERITY RATING SCALE - C-SSRS
6. HAVE YOU EVER DONE ANYTHING, STARTED TO DO ANYTHING, OR PREPARED TO DO ANYTHING TO END YOUR LIFE?: NO
1. IN THE PAST MONTH, HAVE YOU WISHED YOU WERE DEAD OR WISHED YOU COULD GO TO SLEEP AND NOT WAKE UP?: NO
2. HAVE YOU ACTUALLY HAD ANY THOUGHTS OF KILLING YOURSELF?: NO

## 2025-06-16 ASSESSMENT — PAIN - FUNCTIONAL ASSESSMENT
PAIN_FUNCTIONAL_ASSESSMENT: 0-10

## 2025-06-16 NOTE — ANESTHESIA PREPROCEDURE EVALUATION
Patient: Barbara Givens    Procedure Information       Anesthesia Start Date/Time: 06/16/25 1107    Scheduled providers: Aquilino Mattson DO    Procedure: COLONOSCOPY    Location: [facility]            Relevant Problems   Anesthesia (within normal limits)      Pulmonary   (+) COPD (chronic obstructive pulmonary disease) (Multi)   (+) Emphysema/COPD (Multi)      Neuro   (+) Anxiety   (+) Current mild episode of major depressive disorder without prior episode      GI (within normal limits)      /Renal   (+) Bilateral nephrolithiasis      Musculoskeletal   (+) Primary osteoarthritis involving multiple joints      HEENT   (+) Acute bacterial sinusitis   (+) Glaucoma      ID   (+) Acute bacterial sinusitis       Clinical information reviewed:   Tobacco  Allergies  Meds   Med Hx  Surg Hx  OB Status  Fam Hx  Soc   Hx        NPO Detail:  NPO/Void Status  Date of Last Liquid: 06/16/25  Time of Last Liquid: 0600  Date of Last Solid: 06/14/25  Time of Last Solid: 1800         Physical Exam    Airway  Mallampati: II  TM distance: >3 FB  Neck ROM: full  Mouth opening: 3 or more finger widths     Cardiovascular - normal exam  Rhythm: regular  Rate: normal     Dental - normal exam     Pulmonary - normal exam   Abdominal - normal exam           Anesthesia Plan    History of general anesthesia?: yes  History of complications of general anesthesia?: no    ASA 2     MAC     The patient is a current smoker.  Patient was previously instructed to abstain from smoking on day of procedure.  Patient smoked on day of procedure.    intravenous induction   Anesthetic plan and risks discussed with patient.

## 2025-06-16 NOTE — H&P
History Of Present Illness  Barbara Givens is a 65 y.o. female presenting with screening colonoscopy.     Past Medical History  Medical History[1]    Surgical History  Surgical History[2]     Social History  She reports that she has been smoking cigarettes. She has a 45 pack-year smoking history. She has been exposed to tobacco smoke. She has never used smokeless tobacco. She reports current alcohol use of about 2.0 standard drinks of alcohol per week. She reports current drug use. Drug: Marijuana.    Family History  Family History[3]     Allergies  Patient has no known allergies.    Review of Systems     Physical Exam  Constitutional:       General: She is awake.      Appearance: Normal appearance.   HENT:      Head: Normocephalic and atraumatic.      Nose: Nose normal.      Mouth/Throat:      Mouth: Mucous membranes are moist.   Eyes:      Pupils: Pupils are equal, round, and reactive to light.   Neck:      Thyroid: No thyroid mass.      Trachea: Phonation normal.   Cardiovascular:      Rate and Rhythm: Normal rate and regular rhythm.   Pulmonary:      Effort: Pulmonary effort is normal. No respiratory distress.      Breath sounds: Normal air entry. No decreased breath sounds, wheezing, rhonchi or rales.   Abdominal:      General: Bowel sounds are normal. There is no distension.      Palpations: Abdomen is soft.      Tenderness: There is no abdominal tenderness.   Musculoskeletal:      Cervical back: Neck supple.      Right lower leg: No edema.      Left lower leg: No edema.   Skin:     General: Skin is warm.      Capillary Refill: Capillary refill takes less than 2 seconds.   Neurological:      General: No focal deficit present.      Mental Status: She is alert and oriented to person, place, and time. Mental status is at baseline.      Cranial Nerves: Cranial nerves 2-12 are intact.      Motor: Motor function is intact.   Psychiatric:         Attention and Perception: Attention and perception normal.          "Mood and Affect: Mood normal.         Speech: Speech normal.         Behavior: Behavior normal.          Last Recorded Vitals  Blood pressure 131/64, pulse 74, temperature 35.9 °C (96.6 °F), temperature source Temporal, resp. rate 16, height 1.575 m (5' 2\"), weight 58.5 kg (129 lb), SpO2 98%.    Relevant Results        Current Outpatient Medications   Medication Instructions    aspirin 81 mg EC tablet Take by mouth.    biotin 5 mg capsule Take by mouth.    calcium carbonate-vitamin D3 500 mg-5 mcg (200 unit) tablet 1 tablet, oral, 2 times daily    cholecalciferol (Vitamin D-3) 25 MCG (1000 UT) capsule Take by mouth.    hydrOXYzine HCL (ATARAX) 25 mg, oral, 3 times daily PRN    ibuprofen (IBU) 600 mg, oral, 4 times daily PRN    multivitamin with minerals iron-free (Centrum Silver) 1 tablet, oral, Daily    Otezla 30 mg, oral, 2 times daily          Assessment & Plan  Colon cancer screening      Colonoscopy for evaluatiion     Risk and benefits of the endoscopic procedure including bleeding perforation and infection were discussed with patient and they wish to proceed          Aquilino Mattson DO         [1]   Past Medical History:  Diagnosis Date    Other benign neoplasm of skin, unspecified     Acquired digital fibrokeratoma    Personal history of diseases of the skin and subcutaneous tissue     History of psoriasis    Personal history of other diseases of the digestive system     History of hemorrhoids    Personal history of other diseases of the musculoskeletal system and connective tissue     History of back pain    Personal history of other diseases of the nervous system and sense organs     History of migraine    Personal history of other diseases of the nervous system and sense organs     History of carpal tunnel syndrome    Solitary pulmonary nodule     Incidental pulmonary nodule   [2]   Past Surgical History:  Procedure Laterality Date    CARPAL TUNNEL RELEASE  08/26/2016    Neuroplasty Decompression " Median Nerve At Carpal Tunnel     SECTION, CLASSIC  2016     Section    HEMORRHOID SURGERY  2016    Hemorrhoidectomy    OTHER SURGICAL HISTORY  2016    Foot Surgery Left    OTHER SURGICAL HISTORY  10/02/2019    Trigger finger repair    TUBAL LIGATION  2016    Tubal Ligation   [3]   Family History  Problem Relation Name Age of Onset    Prostate cancer Father

## 2025-06-16 NOTE — ANESTHESIA POSTPROCEDURE EVALUATION
Patient: Barbara Givens    Procedure Summary       Date: 06/16/25 Room / Location: [facility]    Anesthesia Start: 1107 Anesthesia Stop: 1135    Procedure: COLONOSCOPY Diagnosis: Colon cancer screening    Scheduled Providers: Aquilino Mattson DO Responsible Provider: SULTANA Andrade    Anesthesia Type: MAC ASA Status: 2            Anesthesia Type: MAC    Vitals Value Taken Time   /82 06/16/25 11:43   Temp 36.2 °C (97.1 °F) 06/16/25 11:43   Pulse 73 06/16/25 11:43   Resp 15 06/16/25 11:43   SpO2 99 % 06/16/25 11:43       Anesthesia Post Evaluation    Patient location during evaluation: bedside  Patient participation: complete - patient participated  Level of consciousness: awake and alert  Pain score: 0  Pain management: adequate  Airway patency: patent  Cardiovascular status: stable  Respiratory status: acceptable  Hydration status: acceptable  Postoperative Nausea and Vomiting: none        There were no known notable events for this encounter.

## 2025-06-23 LAB
LABORATORY COMMENT REPORT: NORMAL
PATH REPORT.FINAL DX SPEC: NORMAL
PATH REPORT.GROSS SPEC: NORMAL
PATH REPORT.RELEVANT HX SPEC: NORMAL
PATH REPORT.TOTAL CANCER: NORMAL

## 2025-10-20 ENCOUNTER — APPOINTMENT (OUTPATIENT)
Dept: PRIMARY CARE | Facility: CLINIC | Age: 65
End: 2025-10-20
Payer: MEDICARE